# Patient Record
Sex: FEMALE | Race: WHITE | HISPANIC OR LATINO | Employment: FULL TIME | URBAN - METROPOLITAN AREA
[De-identification: names, ages, dates, MRNs, and addresses within clinical notes are randomized per-mention and may not be internally consistent; named-entity substitution may affect disease eponyms.]

---

## 2019-03-26 ENCOUNTER — GYNECOLOGY VISIT (OUTPATIENT)
Dept: OBGYN | Facility: CLINIC | Age: 39
End: 2019-03-26
Payer: COMMERCIAL

## 2019-03-26 ENCOUNTER — HOSPITAL ENCOUNTER (OUTPATIENT)
Facility: MEDICAL CENTER | Age: 39
End: 2019-03-26
Attending: OBSTETRICS & GYNECOLOGY
Payer: COMMERCIAL

## 2019-03-26 VITALS — BODY MASS INDEX: 36.8 KG/M2 | DIASTOLIC BLOOD PRESSURE: 70 MMHG | WEIGHT: 228 LBS | SYSTOLIC BLOOD PRESSURE: 120 MMHG

## 2019-03-26 DIAGNOSIS — Z00.00 ENCOUNTER FOR ANNUAL PHYSICAL EXAMINATION EXCLUDING GYNECOLOGICAL EXAMINATION IN A PATIENT OLDER THAN 17 YEARS: ICD-10-CM

## 2019-03-26 DIAGNOSIS — R21 RASH AND NONSPECIFIC SKIN ERUPTION: ICD-10-CM

## 2019-03-26 DIAGNOSIS — N76.3 CHRONIC VULVITIS: ICD-10-CM

## 2019-03-26 PROCEDURE — 87491 CHLMYD TRACH DNA AMP PROBE: CPT

## 2019-03-26 PROCEDURE — 99395 PREV VISIT EST AGE 18-39: CPT | Performed by: OBSTETRICS & GYNECOLOGY

## 2019-03-26 PROCEDURE — 87624 HPV HI-RISK TYP POOLED RSLT: CPT

## 2019-03-26 PROCEDURE — 88175 CYTOPATH C/V AUTO FLUID REDO: CPT

## 2019-03-26 PROCEDURE — 87591 N.GONORRHOEAE DNA AMP PROB: CPT

## 2019-03-26 RX ORDER — TRIAMCINOLONE ACETONIDE 1 MG/G
OINTMENT TOPICAL
Qty: 1 TUBE | Refills: 11 | Status: SHIPPED | OUTPATIENT
Start: 2019-03-26 | End: 2021-03-30

## 2019-03-26 NOTE — PROGRESS NOTES
Well woman visit     Chioma Fields is a 38 y.o.  who presents to [facility] for her Annual Exam.  Today she has symptoms of trunkal rash and vulvar pruritis.    GYN History:  LMP:3/22/19  Menarche: 14  Menses: regular, lasting 4-5 days  Menopause: no  Last pap: 3 years ago, no history of abnormals  Sexually active: yes  History of STDs: no  Fam Hx of breast, ovarian, or colon cancer: no  Contraception- birth control pills     OB History:     vaginal deliveries    Health Maintenance:  Last mammogram: n/a  Last colorectal screening: n/a    Review of Systems:   Pertinent positives documented in HPI and all other systems reviewed & are negative.    All PMH, PSH, allergies, social history and FH reviewed and updated today:  Past Medical History:  Past Medical History:   Diagnosis Date   • Migraine        Past Surgical History:  History reviewed. No pertinent surgical history.    Medications:   Current Outpatient Prescriptions Ordered in [facility]   Medication Sig Dispense Refill   • Norgestim-Eth Estrad Triphasic (ORTHO TRI-CYCLEN, 28, PO) Take  by mouth.     • triamcinolone acetonide (KENALOG) 0.1 % Ointment Apply dime size amount to vulva QHS for one week and then twice weekly thereafter 1 Tube 11     No current [facility]-ordered facility-administered medications on file.        Allergies: Amoxicillin; Aspirin; Bactrim; Latex; Penicillins; and Sulfa drugs    Social History:  Social History     Social History   • Marital status: Single     Spouse name: N/A   • Number of children: N/A   • Years of education: N/A     Social History Main Topics   • Smoking status: Never Smoker   • Smokeless tobacco: Never Used   • Alcohol use No   • Drug use: No   • Sexual activity: Yes     Partners: Male     Other Topics Concern   • Not on file     Social History Narrative   • No narrative on file       Family History:  Family History   Problem Relation Age of Onset   • No Known Problems Mother    • No Known Problems Father    •  No Known Problems Brother            Objective:   Vitals:  Blood pressure 120/70, weight 103.4 kg (228 lb), last menstrual period 03/22/2019.  Body mass index is 36.8 kg/m². (Goal BM I>18 <25)    Physical Exam:   Nursing note and vitals reviewed.  Physical Exam   Constitutional: She is oriented to person, place, and time and well-developed, well-nourished, and in no distress.   HENT:   Head: Normocephalic and atraumatic.   Right Ear: External ear normal.   Left Ear: External ear normal.   Eyes: Pupils are equal, round, and reactive to light. Conjunctivae are normal.   Neck: No tracheal deviation present. No thyromegaly present.   Cardiovascular: Normal rate, regular rhythm and normal heart sounds.    No murmur heard.  Pulmonary/Chest: Effort normal and breath sounds normal.   Abdominal: Soft. Bowel sounds are normal. She exhibits no distension. There is no tenderness.   Genitourinary: Vagina normal, uterus normal, cervix normal, right adnexa normal and left adnexa normal.   Musculoskeletal: Normal range of motion.   Neurological: She is alert and oriented to person, place, and time. Gait normal.   Skin: Skin is warm and dry.   Punctate rash noted along sternum and upper chest   Psychiatric: Mood, memory, affect and judgment normal.     Genitourinary:  Normal appearing external female genitalia without any rashes, lesions, labial fusion or tenderness.  Vagina is pink moist and well rugated. Physiologic discharge present within vaginal vault.  Cervix well visualized without masses or lesions.  On bimanual exam there is no cervical motion tenderness, uterus is midline, not enlarged, fixed, or tender.  No adnexal masses or tenderness.      Assessment/Plan:     1. Chronic vulvitis  triamcinolone acetonide (KENALOG) 0.1 % Ointment   2. Encounter for annual physical examination excluding gynecological examination in a patient older than 17 years  THINPREP PAP W/HPV AND CTNG   3. Rash and nonspecific skin eruption  REFERRAL  TO DERMATOLOGY       Chioma Fields is a 38 y.o.  female who presents for her annual gynecologic exam.   # Preventative health care:   --Breast self awareness discussed.   --Cervical cancer screening. Last Pap 3 years ago. Collected today. HPV sent. I will follow up with patient once results are back as per ASCCP guidelines.   --Smoking - nonsmoker.   --Colorectal screening not indicated.   --Immunizations are up to date.  --Diet, exercise, vitamin supplementation, and hydration discussed.  # Contraception: OCP  # STD screening: declines  # Obesity: Diet and exercise discussed as well as finding lifestyle habits that work for patient.  # rash- derm referral   # vulvar pruritis- triamcinolone cream   # Follow up prn.

## 2019-03-28 LAB
C TRACH DNA GENITAL QL NAA+PROBE: NEGATIVE
CYTOLOGY REG CYTOL: NORMAL
HPV HR 12 DNA CVX QL NAA+PROBE: NEGATIVE
HPV16 DNA SPEC QL NAA+PROBE: NEGATIVE
HPV18 DNA SPEC QL NAA+PROBE: NEGATIVE
N GONORRHOEA DNA GENITAL QL NAA+PROBE: NEGATIVE
SPECIMEN SOURCE: NORMAL
SPECIMEN SOURCE: NORMAL

## 2019-05-17 ENCOUNTER — OFFICE VISIT (OUTPATIENT)
Dept: DERMATOLOGY | Facility: IMAGING CENTER | Age: 39
End: 2019-05-17
Payer: COMMERCIAL

## 2019-05-17 VITALS — TEMPERATURE: 98.3 F | WEIGHT: 224 LBS | BODY MASS INDEX: 36 KG/M2 | HEIGHT: 66 IN

## 2019-05-17 DIAGNOSIS — L85.3 XEROSIS CUTIS: ICD-10-CM

## 2019-05-17 DIAGNOSIS — L81.9 HYPERPIGMENTATION: ICD-10-CM

## 2019-05-17 DIAGNOSIS — L70.0 ACNE VULGARIS: ICD-10-CM

## 2019-05-17 PROCEDURE — 99203 OFFICE O/P NEW LOW 30 MIN: CPT | Performed by: DERMATOLOGY

## 2019-05-17 RX ORDER — CLINDAMYCIN PHOSPHATE 11.9 MG/ML
SOLUTION TOPICAL
Qty: 1 BOTTLE | Refills: 3 | Status: SHIPPED | OUTPATIENT
Start: 2019-05-17

## 2019-05-17 NOTE — PROGRESS NOTES
"CC: rash    Subjective: new patient here for few skin concerns:  1) rash on abdomen  2) dryness/color change in large patches on lower legs, new in last couple years - has grown in size since onset. No known triggers or inflammation known  3) acne on face - worsening over time.  Had clear facial skin until mid-20s.  Had back acne as teen, treated with clindamycin with improvement noted.   Found retin a cream(s) to be very drying    Intermittent Rash  HPI: Abdomen   Onset: 2 years  Symptoms: raised & flat red spots, itchy  Aggravating factors: none  Alleviating factors: triamcinolone cream  New creams/topicals: none  New medications (up to last 6 months): none  New travel: none  Other exposures: none  Treatments: Triamcinolone 0.1% cream PRN, works for pt  Erupted 2 years ago, noted through duration of pregnancy, then Improved since. Still a little itchy.  Able to scratch and get \"balls out of the skin\"  Largely improved/resolved today.  No umbilical involvement    ROS: no fevers/chills. + itch.   DermPMH: no skin cancer/melanoma  No problem-specific Assessment & Plan notes found for this encounter.    Relevant PMH:denies GB disease  Social: never smoker; 10mo at home    PE: Gen:WDWN female in NAD.  Skin: face- few acneiform papules on lower face, shallow cysts on jawline.  Eyes/lips/neck/chest without rashes/lesions noted.  Abdomen - scattered hyperpigmented macules, no papules/pustules present.  No interdigit papules.  Lower legs - moderate-severe xerosis with patches of tan/subtle hyperpigmentation on lateral lower shins    A/P: PIPA, abdomen, legs?: not able to discern primary rash today (denies signs/sx of folliculitis, molluscum, KP?)  -b/r, expect improvement with time  -photoprotect  -RTC for worsening/recurrence/thickening    Acne, face:mild/moderate:  -reviewed dx/tx including topcials (prefers), oral/hormone  -consider trial BPO wash/clindamycin topical BID  -f/u 3 months    Xerosis, diffuse:  -counseled " re: dx/tx  -OTC moisturizers recommended- handout supplied      I have reviewed medications relevant to my specialty.

## 2019-08-30 DIAGNOSIS — R21 VULVAR RASH: ICD-10-CM

## 2019-08-30 RX ORDER — TRIAMCINOLONE ACETONIDE 1 MG/G
1 OINTMENT TOPICAL 2 TIMES DAILY
Qty: 1 TUBE | Refills: 11 | Status: SHIPPED | OUTPATIENT
Start: 2019-08-30 | End: 2021-03-30

## 2019-08-30 NOTE — TELEPHONE ENCOUNTER
Pt called requesting a refill for triamcinolone acetonide 0.1 % ointment tube, states she never receive medication but had some left from previous prescription.

## 2021-03-30 ENCOUNTER — GYNECOLOGY VISIT (OUTPATIENT)
Dept: OBGYN | Facility: CLINIC | Age: 41
End: 2021-03-30
Payer: COMMERCIAL

## 2021-03-30 VITALS — WEIGHT: 234.9 LBS | SYSTOLIC BLOOD PRESSURE: 152 MMHG | DIASTOLIC BLOOD PRESSURE: 95 MMHG | BODY MASS INDEX: 37.91 KG/M2

## 2021-03-30 DIAGNOSIS — K64.9 HEMORRHOIDS, UNSPECIFIED HEMORRHOID TYPE: ICD-10-CM

## 2021-03-30 DIAGNOSIS — Z01.419 ENCOUNTER FOR ANNUAL ROUTINE GYNECOLOGICAL EXAMINATION: ICD-10-CM

## 2021-03-30 DIAGNOSIS — N76.3 CHRONIC VULVITIS: ICD-10-CM

## 2021-03-30 DIAGNOSIS — Z12.31 ENCOUNTER FOR SCREENING MAMMOGRAM FOR MALIGNANT NEOPLASM OF BREAST: ICD-10-CM

## 2021-03-30 PROCEDURE — 99396 PREV VISIT EST AGE 40-64: CPT | Performed by: OBSTETRICS & GYNECOLOGY

## 2021-03-30 RX ORDER — TRIAMCINOLONE ACETONIDE 1 MG/G
OINTMENT TOPICAL
Qty: 80 G | Refills: 10 | Status: SHIPPED | OUTPATIENT
Start: 2021-03-30

## 2021-03-30 RX ORDER — HYDROCORTISONE 25 MG/G
CREAM TOPICAL
Qty: 28 G | Refills: 6 | Status: SHIPPED | OUTPATIENT
Start: 2021-03-30

## 2021-03-30 NOTE — PROGRESS NOTES
Well woman visit     Chioma Fields is a 40 y.o.  who presents to [facility] for her Annual Exam.  Today she has symptoms of trunkal rash and vulvar itching on her left side. She does not sleep on her left side.     GYN History:  LMP:3/14/21  Menarche: 14  Menses: regular, lasting 4-5 days  Menopause: hot flashes and mild sleeplessness  Last pap: 3/26/19. no history of abnormals  Sexually active: yes  History of STDs: no  Fam Hx of breast, ovarian, or colon cancer: no  Contraception- birth control pills     OB History:     vaginal deliveries     Health Maintenance:  Last mammogram: n/a  Last colorectal screening: n/a    Review of Systems:   Pertinent positives documented in HPI and all other systems reviewed & are negative.    All PMH, PSH, allergies, social history and FH reviewed and updated today:  Past Medical History:  Past Medical History:   Diagnosis Date   • Migraine        Past Surgical History:  History reviewed. No pertinent surgical history.    Medications:   Current Outpatient Medications Ordered in Epic   Medication Sig Dispense Refill   • triamcinolone acetonide (KENALOG) 0.1 % Ointment Apply dime size amount to vulva QHS for one week and then twice weekly thereafter 80 g 10   • hydrocortisone rectal (PROCTOSOL HC) 2.5% Cream Apply to affected area prn itching 28 g 6   • clindamycin (CLEOCIN) 1 % Solution AAA face BID - use after benzoyl peroxide OTC wash/gel (Patient not taking: Reported on 3/30/2021) 1 Bottle 3   • Norgestim-Eth Estrad Triphasic (ORTHO TRI-CYCLEN, 28, PO) Take  by mouth.       No current Epic-ordered facility-administered medications on file.       Allergies: Amoxicillin, Aspirin, Bactrim, Latex, Penicillins, and Sulfa drugs    Social History:  Social History     Socioeconomic History   • Marital status: Single     Spouse name: Not on file   • Number of children: Not on file   • Years of education: Not on file   • Highest education level: Not on file   Occupational  History   • Not on file   Tobacco Use   • Smoking status: Never Smoker   • Smokeless tobacco: Never Used   Substance and Sexual Activity   • Alcohol use: Yes     Comment: rare   • Drug use: No   • Sexual activity: Yes     Partners: Male   Other Topics Concern   • Not on file   Social History Narrative   • Not on file     Social Determinants of Health     Financial Resource Strain:    • Difficulty of Paying Living Expenses:    Food Insecurity:    • Worried About Running Out of Food in the Last Year:    • Ran Out of Food in the Last Year:    Transportation Needs:    • Lack of Transportation (Medical):    • Lack of Transportation (Non-Medical):    Physical Activity:    • Days of Exercise per Week:    • Minutes of Exercise per Session:    Stress:    • Feeling of Stress :    Social Connections:    • Frequency of Communication with Friends and Family:    • Frequency of Social Gatherings with Friends and Family:    • Attends [employer] Services:    • Active Member of Clubs or Organizations:    • Attends Club or Organization Meetings:    • Marital Status:    Intimate Partner Violence:    • Fear of Current or Ex-Partner:    • Emotionally Abused:    • Physically Abused:    • Sexually Abused:        Family History:  Family History   Problem Relation Age of Onset   • No Known Problems Mother    • No Known Problems Father    • No Known Problems Brother            Objective:   Vitals:  /95 (BP Location: Right arm, Patient Position: Sitting, BP Cuff Size: Adult)   Wt 107 kg (234 lb 14.4 oz)   Body mass index is 37.91 kg/m². (Goal BM I>18 <25)    Physical Exam:   Nursing note and vitals reviewed.  Physical Exam   Constitutional: She is oriented to person, place, and time and well-developed, well-nourished, and in no distress.   HENT:   Head: Normocephalic and atraumatic.   Right Ear: External ear normal.   Eyes: Pupils are equal, round, and reactive to light. Conjunctivae are normal.   Neck: No thyromegaly present.    Cardiovascular: Intact distal pulses.   Pulmonary/Chest: Effort normal and breath sounds normal.   Abdominal: Soft. Bowel sounds are normal.   Musculoskeletal:         General: Normal range of motion.      Cervical back: Normal range of motion and neck supple.   Neurological: She is alert and oriented to person, place, and time. Gait normal.   Skin: Skin is warm and dry.   Psychiatric: Mood, memory, affect and judgment normal.   Genitourinary:  Normal appearing external female genitalia without any rashes, lesions, labial fusion or tenderness.  Vagina is pink moist and well rugated. Some excoriation noted on the left side. Physiologic discharge present within vaginal vault.  Cervix well visualized without masses or lesions.  On bimanual exam there is no cervical motion tenderness, uterus is midline, not enlarged, fixed, or tender.  No adnexal masses or tenderness.   Breast: No masses, skin dimpling, or lymphadenopathy noted bilaterally.  No nipple discharge.  Nipples everted.      Assessment/Plan:     1. Encounter for screening mammogram for malignant neoplasm of breast  MA-SCREENING MAMMO BILAT W/TOMOSYNTHESIS W/CAD   2. Encounter for annual routine gynecological examination     3. Chronic vulvitis  triamcinolone acetonide (KENALOG) 0.1 % Ointment   4. Hemorrhoids, unspecified hemorrhoid type  hydrocortisone rectal (PROCTOSOL HC) 2.5% Cream       Chioma Fields is a 40 y.o.  female who presents for her annual gynecologic exam.   # Preventative health care:   --Breast self awareness discussed. Mammogram ordered (annually starting at age 40).  --Cervical cancer screening. Last Pap - HPV neg and normal, not done today. Discussed ..   --Colorectal screening not indicated.   --Immunizations are up to date.  --Diet, exercise, vitamin supplementation, and hydration discussed.  # Contraception: OCP  # STD screening: not requested  # triamcinolone sent for chronic vulvitis  # Follow up annually or  prn.

## 2021-03-30 NOTE — NON-PROVIDER
Pt is here for Annual exam  Good phone #:[phone]  Pharmacy verifed.  Last pap:3/26/2019, NEG  Mammo:Never  BCM:None  LMP:3/14/2021  Pt states no other complaints for today.

## 2023-11-10 ENCOUNTER — HOSPITAL ENCOUNTER (OUTPATIENT)
Facility: MEDICAL CENTER | Age: 43
End: 2023-11-10
Attending: OBSTETRICS & GYNECOLOGY
Payer: COMMERCIAL

## 2023-11-10 ENCOUNTER — GYNECOLOGY VISIT (OUTPATIENT)
Dept: OBGYN | Facility: CLINIC | Age: 43
End: 2023-11-10
Payer: COMMERCIAL

## 2023-11-10 VITALS — WEIGHT: 225.8 LBS | BODY MASS INDEX: 36.45 KG/M2 | SYSTOLIC BLOOD PRESSURE: 126 MMHG | DIASTOLIC BLOOD PRESSURE: 73 MMHG

## 2023-11-10 DIAGNOSIS — N93.9 ABNORMAL UTERINE BLEEDING: ICD-10-CM

## 2023-11-10 DIAGNOSIS — N93.9 ABNORMAL UTERINE BLEEDING: Primary | ICD-10-CM

## 2023-11-10 DIAGNOSIS — Z01.419 ENCOUNTER FOR ANNUAL ROUTINE GYNECOLOGICAL EXAMINATION: ICD-10-CM

## 2023-11-10 LAB — PATHOLOGY CONSULT NOTE: NORMAL

## 2023-11-10 PROCEDURE — 87591 N.GONORRHOEAE DNA AMP PROB: CPT

## 2023-11-10 PROCEDURE — 88305 TISSUE EXAM BY PATHOLOGIST: CPT

## 2023-11-10 PROCEDURE — 87624 HPV HI-RISK TYP POOLED RSLT: CPT

## 2023-11-10 PROCEDURE — 99396 PREV VISIT EST AGE 40-64: CPT | Performed by: OBSTETRICS & GYNECOLOGY

## 2023-11-10 PROCEDURE — 3078F DIAST BP <80 MM HG: CPT | Performed by: OBSTETRICS & GYNECOLOGY

## 2023-11-10 PROCEDURE — 3074F SYST BP LT 130 MM HG: CPT | Performed by: OBSTETRICS & GYNECOLOGY

## 2023-11-10 PROCEDURE — 88175 CYTOPATH C/V AUTO FLUID REDO: CPT

## 2023-11-10 PROCEDURE — 87491 CHLMYD TRACH DNA AMP PROBE: CPT

## 2023-11-10 RX ORDER — FLUCONAZOLE 150 MG/1
TABLET ORAL
COMMUNITY
Start: 2023-10-05

## 2023-11-10 RX ORDER — CYCLOBENZAPRINE HCL 10 MG
TABLET ORAL
COMMUNITY
Start: 2023-10-05

## 2023-11-10 RX ORDER — MELOXICAM 15 MG/1
TABLET ORAL
COMMUNITY
Start: 2023-10-27

## 2023-11-10 NOTE — PROGRESS NOTES
Well woman visit     Chioma Fields is a 43 y.o.  who presents for her Annual Exam.  Has been experienced being very heavy abnormal menses.  She states sometimes they come twice a month and they are very heavy.    GYN History:  LMP 10/9/23  Menarche: 14  Menses: see above  Menopause: no  Last pap: 3 years ago, no history of abnormals  Sexually active: no  History of STDs: no  Fam Hx of breast, ovarian, or colon cancer: no  Contraception- not sexually active      OB History:     vaginal deliveries     Health Maintenance:  Last mammogram: n/a  Last colorectal screening: n/a    Review of Systems:   ROS:  Reviewed and negative with pertinent positives listed in HPI above    All PMH, PSH, allergies, social history and FH reviewed and updated today:  Past Medical History:  Past Medical History:   Diagnosis Date    Migraine        Past Surgical History:  History reviewed. No pertinent surgical history.    Medications:   Current Outpatient Medications Ordered in Epic   Medication Sig Dispense Refill    cyclobenzaprine (FLEXERIL) 10 mg Tab       meloxicam (MOBIC) 15 MG tablet       triamcinolone acetonide (KENALOG) 0.1 % Ointment Apply dime size amount to vulva QHS for one week and then twice weekly thereafter 80 g 10    hydrocortisone rectal (PROCTOSOL HC) 2.5% Cream Apply to affected area prn itching 28 g 6    fluconazole (DIFLUCAN) 150 MG tablet  (Patient not taking: Reported on 11/10/2023)      clindamycin (CLEOCIN) 1 % Solution AAA face BID - use after benzoyl peroxide OTC wash/gel (Patient not taking: Reported on 3/30/2021) 1 Bottle 3    Norgestim-Eth Estrad Triphasic (ORTHO TRI-CYCLEN, 28, PO) Take  by mouth.       No current Epic-ordered facility-administered medications on file.       Allergies: Amoxicillin, Aspirin, Bactrim, Latex, Penicillins, and Sulfa drugs    Social History:  Social History     Socioeconomic History    Marital status: Single   Tobacco Use    Smoking status: Never    Smokeless  tobacco: Never   Vaping Use    Vaping Use: Never used   Substance and Sexual Activity    Alcohol use: Yes     Comment: rare    Drug use: No    Sexual activity: Yes     Partners: Male     Birth control/protection: None       Family History:  Family History   Problem Relation Age of Onset    Hypertension Mother     Diabetes Mother     No Known Problems Father     No Known Problems Brother            Objective:   Vitals:  /73 (BP Location: Right arm, Patient Position: Sitting, BP Cuff Size: Adult)   Wt 225 lb 12.8 oz   Body mass index is 36.45 kg/m². (Goal BM I>18 <25)    Physical Exam:   Nursing note and vitals reviewed.  Physical Exam   Constitutional: She is oriented to person, place, and time and well-developed, well-nourished, and in no distress.   HENT:   Head: Normocephalic and atraumatic.   Right Ear: External ear normal.   Eyes: Pupils are equal, round, and reactive to light. Conjunctivae are normal.   Neck: No thyromegaly present.   Cardiovascular: Intact distal pulses.   Pulmonary/Chest: Effort normal and breath sounds normal.   Abdominal: Soft. Bowel sounds are normal.   Musculoskeletal:         General: Normal range of motion.      Cervical back: Normal range of motion and neck supple.   Neurological: She is alert and oriented to person, place, and time. Gait normal.   Skin: Skin is warm and dry.   Psychiatric: Mood, memory, affect and judgment normal.   Genitourinary:  Normal appearing external female genitalia without any rashes, lesions, labial fusion or tenderness.  Ingrown hair noted at the top of the left labia majora. Vagina is pink moist and well rugated. Physiologic discharge present within vaginal vault.  Cervix well visualized without masses or lesions.  On bimanual exam there is no cervical motion tenderness, uterus is midline, not enlarged, fixed, or tender.  No adnexal masses or tenderness.   Breast: No masses, skin dimpling, or lymphadenopathy noted bilaterally.  No nipple discharge.   Nipples everted.      Assessment/Plan:     1. Abnormal uterine bleeding  THINPREP PAP W/HPV AND CTNG    US-PELVIC COMPLETE (TRANSABDOMINAL/TRANSVAGINAL) (COMBO)    Consent for all Surgical, Special Diagnostic or Therapeutic Procedures    Pathology Specimen      2. Encounter for annual routine gynecological examination  THINPREP PAP W/HPV AND CTNG    US-PELVIC COMPLETE (TRANSABDOMINAL/TRANSVAGINAL) (COMBO)          Chioma Fields is a 43 y.o.  female who presents for her annual gynecologic exam.   # Preventative health care:   --Breast self awareness discussed. Mammogram UTD (annually starting at age 40).  --Cervical cancer screening-   --Smoking - not a smoker.   --Colorectal screening not indicated.   --Immunizations are up to date.  # Contraception: abstinent  # STD screening: declined  # AUB- has had all over body rash with the use of hormones in the past.  Briefly discussed ablation versus hysterectomy to control abnormal uterine bleeding.  Endometrial biopsy performed today and pelvic ultrasound ordered.  Plan will be for patient to follow-up so that we can discuss the results of her ultrasound and determine how she would like to proceed to control her abnormal bleeding.  # Follow up to discuss next steps for bleeding.

## 2023-11-15 LAB
C TRACH RRNA CVX QL NAA+PROBE: NEGATIVE
CYTOLOGIST CVX/VAG CYTO: NORMAL
CYTOLOGY CVX/VAG DOC CYTO: NORMAL
CYTOLOGY CVX/VAG DOC THIN PREP: NORMAL
HPV I/H RISK 4 DNA CVX QL PROBE+SIG AMP: NEGATIVE
N GONORRHOEA RRNA CVX QL NAA+PROBE: NEGATIVE
NOTE NL11727A: NORMAL
OTHER STN SPEC: NORMAL
STAT OF ADQ CVX/VAG CYTO-IMP: NORMAL

## 2024-01-06 ENCOUNTER — APPOINTMENT (OUTPATIENT)
Dept: RADIOLOGY | Facility: MEDICAL CENTER | Age: 44
End: 2024-01-06
Attending: OBSTETRICS & GYNECOLOGY
Payer: COMMERCIAL

## 2024-05-10 ENCOUNTER — APPOINTMENT (OUTPATIENT)
Dept: RADIOLOGY | Facility: MEDICAL CENTER | Age: 44
End: 2024-05-10
Attending: OBSTETRICS & GYNECOLOGY
Payer: COMMERCIAL

## 2024-05-10 DIAGNOSIS — N93.9 ABNORMAL UTERINE BLEEDING: ICD-10-CM

## 2024-05-10 DIAGNOSIS — Z01.419 ENCOUNTER FOR ANNUAL ROUTINE GYNECOLOGICAL EXAMINATION: ICD-10-CM

## 2024-05-15 ENCOUNTER — TELEPHONE (OUTPATIENT)
Dept: OBGYN | Facility: CLINIC | Age: 44
End: 2024-05-15
Payer: COMMERCIAL

## 2024-05-15 NOTE — TELEPHONE ENCOUNTER
Lizzeth,  Please schedule pt in for July. When the Dr. Medina comes back next week I will consult with her in regards to a sooner appointment. Thank you.

## 2024-05-15 NOTE — TELEPHONE ENCOUNTER
----- Message from Lizzeth Hughes sent at 5/14/2024 10:24 AM PDT -----  Regarding: need resluts appt  Pt called to schedule her fv re results. There is nothing available until the July schedule comes out or cxl.

## 2024-07-11 ENCOUNTER — GYNECOLOGY VISIT (OUTPATIENT)
Dept: OBGYN | Facility: CLINIC | Age: 44
End: 2024-07-11
Payer: COMMERCIAL

## 2024-07-11 ENCOUNTER — APPOINTMENT (RX ONLY)
Dept: URBAN - METROPOLITAN AREA CLINIC 6 | Facility: CLINIC | Age: 44
Setting detail: DERMATOLOGY
End: 2024-07-11

## 2024-07-11 VITALS — DIASTOLIC BLOOD PRESSURE: 75 MMHG | WEIGHT: 229 LBS | SYSTOLIC BLOOD PRESSURE: 129 MMHG | BODY MASS INDEX: 36.96 KG/M2

## 2024-07-11 DIAGNOSIS — L70.8 OTHER ACNE: ICD-10-CM | Status: INADEQUATELY CONTROLLED

## 2024-07-11 DIAGNOSIS — N93.9 ABNORMAL UTERINE BLEEDING: ICD-10-CM

## 2024-07-11 LAB
POCT INT CON NEG: NEGATIVE
POCT INT CON POS: POSITIVE
POCT URINE PREGNANCY TEST: NEGATIVE

## 2024-07-11 PROCEDURE — 3074F SYST BP LT 130 MM HG: CPT | Performed by: OBSTETRICS & GYNECOLOGY

## 2024-07-11 PROCEDURE — 99214 OFFICE O/P EST MOD 30 MIN: CPT | Mod: 25 | Performed by: OBSTETRICS & GYNECOLOGY

## 2024-07-11 PROCEDURE — 99204 OFFICE O/P NEW MOD 45 MIN: CPT

## 2024-07-11 PROCEDURE — ? COUNSELING

## 2024-07-11 PROCEDURE — ? ADDITIONAL NOTES

## 2024-07-11 PROCEDURE — 81025 URINE PREGNANCY TEST: CPT | Performed by: OBSTETRICS & GYNECOLOGY

## 2024-07-11 PROCEDURE — 3078F DIAST BP <80 MM HG: CPT | Performed by: OBSTETRICS & GYNECOLOGY

## 2024-07-11 PROCEDURE — 58300 INSERT INTRAUTERINE DEVICE: CPT | Performed by: OBSTETRICS & GYNECOLOGY

## 2024-07-11 PROCEDURE — ? PRESCRIPTION

## 2024-07-11 PROCEDURE — ? PRESCRIPTION MEDICATION MANAGEMENT

## 2024-07-11 RX ORDER — SPIRONOLACTONE 100 MG/1
TABLET, FILM COATED ORAL
Qty: 60 | Refills: 3 | Status: ERX | COMMUNITY
Start: 2024-07-11

## 2024-07-11 RX ORDER — TRETIONIN 0.25 MG/G
CREAM TOPICAL QHS
Qty: 45 | Refills: 3 | Status: ERX | COMMUNITY
Start: 2024-07-11

## 2024-07-11 RX ORDER — CLINDAMYCIN PHOSPHATE 10 MG/ML
LOTION TOPICAL
Qty: 60 | Refills: 5 | Status: ERX | COMMUNITY
Start: 2024-07-11

## 2024-07-11 RX ADMIN — TRETIONIN: 0.25 CREAM TOPICAL at 00:00

## 2024-07-11 RX ADMIN — SPIRONOLACTONE: 100 TABLET, FILM COATED ORAL at 00:00

## 2024-07-11 RX ADMIN — CLINDAMYCIN PHOSPHATE: 10 LOTION TOPICAL at 00:00

## 2024-07-11 ASSESSMENT — LOCATION SIMPLE DESCRIPTION DERM
LOCATION SIMPLE: RIGHT CHEEK
LOCATION SIMPLE: LEFT CHEEK

## 2024-07-11 ASSESSMENT — LOCATION DETAILED DESCRIPTION DERM
LOCATION DETAILED: RIGHT CENTRAL MALAR CHEEK
LOCATION DETAILED: LEFT CENTRAL MALAR CHEEK

## 2024-07-11 ASSESSMENT — SEVERITY ASSESSMENT OVERALL AMONG ALL PATIENTS
IN YOUR EXPERIENCE, AMONG ALL PATIENTS YOU HAVE SEEN WITH THIS CONDITION, HOW SEVERE IS THIS PATIENT'S CONDITION?: MULTIPLE INFLAMMATORY LESIONS BUT NONINFLAMMATORY LESIONS PREDOMINATE

## 2024-07-11 ASSESSMENT — LOCATION ZONE DERM: LOCATION ZONE: FACE

## 2024-07-11 NOTE — PROCEDURE: PRESCRIPTION MEDICATION MANAGEMENT
Initiate Treatment: Clindamycin 1% lotion QAM, Spironolactone 100-200mg QD and Tretinoin 0.025% cream QHS as tolerated.
Render In Strict Bullet Format?: No
Detail Level: Zone
Modify Regimen: Not currently taking OCPs.

## 2024-07-11 NOTE — PROCEDURE: ADDITIONAL NOTES
Additional Notes: Morning Regimen:\\n- Wash face using a 4-6% BPO cleanser such as CeraVe Acne Foaming cleanser\\n- Apply Clindamycin 1 % lotion to face\\n- If, dry, moisturize with La Roche Posay Double Facial Repair moisturizer\\n-Take Spironolactone 100 mg tablet \\n\\nEvening Regimen:\\n- Wash face using a gentle cleanser such as CeraVe Facial Foaming cleanser\\n- Apply tretinoin 0.025% cream to face using the Five Dot Method\\n- Moisturize to diffuse
Render Risk Assessment In Note?: no
Detail Level: Detailed

## 2024-07-11 NOTE — PROCEDURE: COUNSELING
High Dose Vitamin A Counseling: Side effects reviewed, pt to contact office should one occur.
Spironolactone Pregnancy And Lactation Text: This medication can cause feminization of the male fetus and should be avoided during pregnancy. The active metabolite is also found in breast milk.
Benzoyl Peroxide Counseling: Patient counseled that medicine may cause skin irritation and bleach clothing.  In the event of skin irritation, the patient was advised to reduce the amount of the drug applied or use it less frequently.   The patient verbalized understanding of the proper use and possible adverse effects of benzoyl peroxide.  All of the patient's questions and concerns were addressed.
Topical Clindamycin Counseling: Patient counseled that this medication may cause skin irritation or allergic reactions.  In the event of skin irritation, the patient was advised to reduce the amount of the drug applied or use it less frequently.   The patient verbalized understanding of the proper use and possible adverse effects of clindamycin.  All of the patient's questions and concerns were addressed.
Doxycycline Pregnancy And Lactation Text: This medication is Pregnancy Category D and not consider safe during pregnancy. It is also excreted in breast milk but is considered safe for shorter treatment courses.
Use Enhanced Medication Counseling?: No
Bactrim Counseling:  I discussed with the patient the risks of sulfa antibiotics including but not limited to GI upset, allergic reaction, drug rash, diarrhea, dizziness, photosensitivity, and yeast infections.  Rarely, more serious reactions can occur including but not limited to aplastic anemia, agranulocytosis, methemoglobinemia, blood dyscrasias, liver or kidney failure, lung infiltrates or desquamative/blistering drug rashes.
Minocycline Counseling: Patient advised regarding possible photosensitivity and discoloration of the teeth, skin, lips, tongue and gums.  Patient instructed to avoid sunlight, if possible.  When exposed to sunlight, patients should wear protective clothing, sunglasses, and sunscreen.  The patient was instructed to call the office immediately if the following severe adverse effects occur:  hearing changes, easy bruising/bleeding, severe headache, or vision changes.  The patient verbalized understanding of the proper use and possible adverse effects of minocycline.  All of the patient's questions and concerns were addressed.
Tetracycline Pregnancy And Lactation Text: This medication is Pregnancy Category D and not consider safe during pregnancy. It is also excreted in breast milk.
Topical Sulfur Applications Counseling: Topical Sulfur Counseling: Patient counseled that this medication may cause skin irritation or allergic reactions.  In the event of skin irritation, the patient was advised to reduce the amount of the drug applied or use it less frequently.   The patient verbalized understanding of the proper use and possible adverse effects of topical sulfur application.  All of the patient's questions and concerns were addressed.
Azithromycin Counseling:  I discussed with the patient the risks of azithromycin including but not limited to GI upset, allergic reaction, drug rash, diarrhea, and yeast infections.
Dapsone Pregnancy And Lactation Text: This medication is Pregnancy Category C and is not considered safe during pregnancy or breast feeding.
Topical Sulfur Applications Pregnancy And Lactation Text: This medication is Pregnancy Category C and has an unknown safety profile during pregnancy. It is unknown if this topical medication is excreted in breast milk.
Birth Control Pills Pregnancy And Lactation Text: This medication should be avoided if pregnant and for the first 30 days post-partum.
Isotretinoin Counseling: Patient should get monthly blood tests, not donate blood, not drive at night if vision affected, not share medication, and not undergo elective surgery for 6 months after tx completed. Side effects reviewed, pt to contact office should one occur.
Azelaic Acid Counseling: Patient counseled that medicine may cause skin irritation and to avoid applying near the eyes.  In the event of skin irritation, the patient was advised to reduce the amount of the drug applied or use it less frequently.   The patient verbalized understanding of the proper use and possible adverse effects of azelaic acid.  All of the patient's questions and concerns were addressed.
Bactrim Pregnancy And Lactation Text: This medication is Pregnancy Category D and is known to cause fetal risk.  It is also excreted in breast milk.
Erythromycin Counseling:  I discussed with the patient the risks of erythromycin including but not limited to GI upset, allergic reaction, drug rash, diarrhea, increase in liver enzymes, and yeast infections.
Aklief counseling:  Patient advised to apply a pea-sized amount only at bedtime and wait 30 minutes after washing their face before applying.  If too drying, patient may add a non-comedogenic moisturizer.  The most commonly reported side effects including irritation, redness, scaling, dryness, stinging, burning, itching, and increased risk of sunburn.  The patient verbalized understanding of the proper use and possible adverse effects of retinoids.  All of the patient's questions and concerns were addressed.
Winlevi Pregnancy And Lactation Text: This medication is considered safe during pregnancy and breastfeeding.
Tazorac Counseling:  Patient advised that medication is irritating and drying.  Patient may need to apply sparingly and wash off after an hour before eventually leaving it on overnight.  The patient verbalized understanding of the proper use and possible adverse effects of tazorac.  All of the patient's questions and concerns were addressed.
High Dose Vitamin A Pregnancy And Lactation Text: High dose vitamin A therapy is contraindicated during pregnancy and breast feeding.
Tetracycline Counseling: Patient counseled regarding possible photosensitivity and increased risk for sunburn.  Patient instructed to avoid sunlight, if possible.  When exposed to sunlight, patients should wear protective clothing, sunglasses, and sunscreen.  The patient was instructed to call the office immediately if the following severe adverse effects occur:  hearing changes, easy bruising/bleeding, severe headache, or vision changes.  The patient verbalized understanding of the proper use and possible adverse effects of tetracycline.  All of the patient's questions and concerns were addressed. Patient understands to avoid pregnancy while on therapy due to potential birth defects.
Benzoyl Peroxide Pregnancy And Lactation Text: This medication is Pregnancy Category C. It is unknown if benzoyl peroxide is excreted in breast milk.
Detail Level: Zone
Topical Retinoid counseling:  Patient advised to apply a pea-sized amount only at bedtime and wait 30 minutes after washing their face before applying.  If too drying, patient may add a non-comedogenic moisturizer. The patient verbalized understanding of the proper use and possible adverse effects of retinoids.  All of the patient's questions and concerns were addressed.
Isotretinoin Pregnancy And Lactation Text: This medication is Pregnancy Category X and is considered extremely dangerous during pregnancy. It is unknown if it is excreted in breast milk.
Dapsone Counseling: I discussed with the patient the risks of dapsone including but not limited to hemolytic anemia, agranulocytosis, rashes, methemoglobinemia, kidney failure, peripheral neuropathy, headaches, GI upset, and liver toxicity.  Patients who start dapsone require monitoring including baseline LFTs and weekly CBCs for the first month, then every month thereafter.  The patient verbalized understanding of the proper use and possible adverse effects of dapsone.  All of the patient's questions and concerns were addressed.
Spironolactone Counseling: Patient advised regarding risks of diarrhea, abdominal pain, hyperkalemia, birth defects (for female patients), liver toxicity and renal toxicity. The patient may need blood work to monitor liver and kidney function and potassium levels while on therapy. The patient verbalized understanding of the proper use and possible adverse effects of spironolactone.  All of the patient's questions and concerns were addressed.
Azelaic Acid Pregnancy And Lactation Text: This medication is considered safe during pregnancy and breast feeding.
Topical Clindamycin Pregnancy And Lactation Text: This medication is Pregnancy Category B and is considered safe during pregnancy. It is unknown if it is excreted in breast milk.
Azithromycin Pregnancy And Lactation Text: This medication is considered safe during pregnancy and is also secreted in breast milk.
Doxycycline Counseling:  Patient counseled regarding possible photosensitivity and increased risk for sunburn.  Patient instructed to avoid sunlight, if possible.  When exposed to sunlight, patients should wear protective clothing, sunglasses, and sunscreen.  The patient was instructed to call the office immediately if the following severe adverse effects occur:  hearing changes, easy bruising/bleeding, severe headache, or vision changes.  The patient verbalized understanding of the proper use and possible adverse effects of doxycycline.  All of the patient's questions and concerns were addressed.
Erythromycin Pregnancy And Lactation Text: This medication is Pregnancy Category B and is considered safe during pregnancy. It is also excreted in breast milk.
Birth Control Pills Counseling: Birth Control Pill Counseling: I discussed with the patient the potential side effects of OCPs including but not limited to increased risk of stroke, heart attack, thrombophlebitis, deep venous thrombosis, hepatic adenomas, breast changes, GI upset, headaches, and depression.  The patient verbalized understanding of the proper use and possible adverse effects of OCPs. All of the patient's questions and concerns were addressed.
Sarecycline Counseling: Patient advised regarding possible photosensitivity and discoloration of the teeth, skin, lips, tongue and gums.  Patient instructed to avoid sunlight, if possible.  When exposed to sunlight, patients should wear protective clothing, sunglasses, and sunscreen.  The patient was instructed to call the office immediately if the following severe adverse effects occur:  hearing changes, easy bruising/bleeding, severe headache, or vision changes.  The patient verbalized understanding of the proper use and possible adverse effects of sarecycline.  All of the patient's questions and concerns were addressed.
Aklief Pregnancy And Lactation Text: It is unknown if this medication is safe to use during pregnancy.  It is unknown if this medication is excreted in breast milk.  Breastfeeding women should use the topical cream on the smallest area of the skin for the shortest time needed while breastfeeding.  Do not apply to nipple and areola.
Topical Retinoid Pregnancy And Lactation Text: This medication is Pregnancy Category C. It is unknown if this medication is excreted in breast milk.
Tazorac Pregnancy And Lactation Text: This medication is not safe during pregnancy. It is unknown if this medication is excreted in breast milk.
Winlevi Counseling:  I discussed with the patient the risks of topical clascoterone including but not limited to erythema, scaling, itching, and stinging. Patient voiced their understanding.

## 2024-08-27 ENCOUNTER — GYNECOLOGY VISIT (OUTPATIENT)
Dept: OBGYN | Facility: CLINIC | Age: 44
End: 2024-08-27
Payer: COMMERCIAL

## 2024-08-27 VITALS — DIASTOLIC BLOOD PRESSURE: 80 MMHG | SYSTOLIC BLOOD PRESSURE: 121 MMHG | BODY MASS INDEX: 37.24 KG/M2 | WEIGHT: 230.7 LBS

## 2024-08-27 DIAGNOSIS — R53.83 OTHER FATIGUE: ICD-10-CM

## 2024-08-27 DIAGNOSIS — Z30.432 ENCOUNTER FOR IUD REMOVAL: Primary | ICD-10-CM

## 2024-08-27 DIAGNOSIS — Z12.31 ENCOUNTER FOR SCREENING MAMMOGRAM FOR MALIGNANT NEOPLASM OF BREAST: ICD-10-CM

## 2024-08-27 DIAGNOSIS — N93.9 ABNORMAL UTERINE BLEEDING (AUB): ICD-10-CM

## 2024-08-27 RX ORDER — NABUMETONE 500 MG/1
500 TABLET, FILM COATED ORAL 2 TIMES DAILY
COMMUNITY

## 2024-08-27 RX ORDER — SPIRONOLACTONE 100 MG/1
100 TABLET, FILM COATED ORAL DAILY
COMMUNITY

## 2024-08-27 RX ORDER — TRANEXAMIC ACID 650 MG/1
TABLET ORAL
Qty: 30 TABLET | Refills: 11 | Status: SHIPPED | OUTPATIENT
Start: 2024-08-27

## 2024-08-27 NOTE — PROCEDURES
IUD Removal    Date/Time: 8/27/2024 2:31 PM    Performed by: Rafaela Medina D.O.  Authorized by: Rafaela Medina D.O.    Consent:     Consent obtained:  Verbal and written    Consent given by:  Patient    Procedure risks and benefits discussed: yes      Patient questions answered: yes      Patient agrees, verbalizes understanding, and wants to proceed: yes    Pre-procedure details:     Reason for removal: other      Length of time IUD in place:  1 month  Procedure:     Pelvic exam performed: yes      Speculum placed: yes      IUD strings visualized in external os: yes      Removal mechanism:  Forceps    IUD removed intact: yes      IUD type removed:  Mirena  Post-procedure:     New birth control prescribed: no      Counseling regarding contraception given: no

## 2024-08-27 NOTE — PROGRESS NOTES
Pt here for IUD Removal  Pt states that she feels fatigue-does not like IUD and requests removal today.  Good #[phone]  Pharmacy verified.  New Mammo Order Today.  Last Pap: 11/10/23 Negative

## 2024-08-27 NOTE — PROGRESS NOTES
GYN visit    CC/reason for visit: AUB    HPI: Chioma Fields is a 44 y.o.  with AUB and fibroid uterus. We placed and IUD last visit one month ago to help with AUB and patient states the IUD has caused her to feel very fatigued and she wants it removed.     ROS:  Reviewed and negative x 10 with pertinent positives listed in HPI above        GYN History:   No h/o abnormal pap, nor history of cone biopsy, LEEP or any other cervical, uterine or gynecologic surgery. No history of sexually transmitted diseases.       OB history:    OB History    Para Term  AB Living   3 2 2   1 2   SAB IAB Ectopic Molar Multiple Live Births   1         2      # Outcome Date GA Lbr Edmar/2nd Weight Sex Type Anes PTL Lv   3 Term 18 40w0d   M Vag-Spont   NANCY   2 Term 13 40w0d   F Vag-Spont   NANCY   1 1995               Past Medical History:   Diagnosis Date    Migraine        History reviewed. No pertinent surgical history.    Medications:   Current Outpatient Medications Ordered in Epic   Medication Sig Dispense Refill    nabumetone (RELAFEN) 500 MG Tab Take 500 mg by mouth 2 times a day.      spironolactone (ALDACTONE) 100 MG Tab Take 100 mg by mouth every day.      Tranexamic Acid 650 MG Tab Take two tablets orally three time daily at the start of menses for up to 5 days 30 Tablet 11    cyclobenzaprine (FLEXERIL) 10 mg Tab       triamcinolone acetonide (KENALOG) 0.1 % Ointment Apply dime size amount to vulva QHS for one week and then twice weekly thereafter 80 g 10    hydrocortisone rectal (PROCTOSOL HC) 2.5% Cream Apply to affected area prn itching 28 g 6    fluconazole (DIFLUCAN) 150 MG tablet  (Patient not taking: Reported on 11/10/2023)      meloxicam (MOBIC) 15 MG tablet       clindamycin (CLEOCIN) 1 % Solution AAA face BID - use after benzoyl peroxide OTC wash/gel (Patient not taking: Reported on 3/30/2021) 1 Bottle 3    Norgestim-Eth Estrad Triphasic (ORTHO TRI-CYCLEN, 28, PO) Take  by mouth.        No current Epic-ordered facility-administered medications on file.       Allergies: Amoxicillin, Aspirin, Bactrim, Latex, Penicillins, and Sulfa drugs    Social History     Socioeconomic History    Marital status: Single   Tobacco Use    Smoking status: Never    Smokeless tobacco: Never   Vaping Use    Vaping status: Never Used   Substance and Sexual Activity    Alcohol use: Yes     Comment: rare    Drug use: No    Sexual activity: Yes     Partners: Male     Birth control/protection: None       Family History   Problem Relation Age of Onset    Hypertension Mother     Diabetes Mother     No Known Problems Father     No Known Problems Brother          Physical Exam:  /80 (BP Location: Left arm, Patient Position: Sitting, BP Cuff Size: Adult)   Wt 230 lb 11.2 oz   BMI 37.24 kg/m²   gen: AAO, NAD, affect appropriate  CV: No edema, cyanosis, or clubbing  Resp: Symmetric non labored breathing  Abd: soft, NT, ND, no masses, no organomegaly, no hernias  : NEFG, normal urethral meatus, normal anus/perineum, normal vagina and cervix.  Uterus midline, anteverted, no adnexal masses/tenderness  Skin: warm/dry, no lesions    A/P: 44 y.o.  with   1. Encounter for IUD removal  Consent for all Surgical, Special Diagnostic or Therapeutic Procedures      2. Other fatigue  CBC WITH DIFFERENTIAL    TSH    FREE THYROXINE      3. Encounter for screening mammogram for malignant neoplasm of breast  MA-SCREENING MAMMO BILAT W/TOMOSYNTHESIS W/CAD      4. Abnormal uterine bleeding (AUB)  Tranexamic Acid 650 MG Tab        IUD removed. CBC and TSH, T4 for fatigue. Will try Lysteda for now to control bleeding and patient will follow up to discuss hysterectomy.